# Patient Record
Sex: FEMALE | Race: ASIAN | URBAN - METROPOLITAN AREA
[De-identification: names, ages, dates, MRNs, and addresses within clinical notes are randomized per-mention and may not be internally consistent; named-entity substitution may affect disease eponyms.]

---

## 2017-12-11 ENCOUNTER — OFFICE VISIT (OUTPATIENT)
Dept: INTERNAL MEDICINE CLINIC | Age: 22
End: 2017-12-11

## 2017-12-11 VITALS
SYSTOLIC BLOOD PRESSURE: 126 MMHG | BODY MASS INDEX: 26.24 KG/M2 | HEART RATE: 72 BPM | DIASTOLIC BLOOD PRESSURE: 82 MMHG | HEIGHT: 61 IN | WEIGHT: 139 LBS

## 2017-12-11 DIAGNOSIS — K21.9 GASTROESOPHAGEAL REFLUX DISEASE WITHOUT ESOPHAGITIS: ICD-10-CM

## 2017-12-11 DIAGNOSIS — E03.9 ACQUIRED HYPOTHYROIDISM: ICD-10-CM

## 2017-12-11 DIAGNOSIS — Z83.3 FAMILY HISTORY OF DIABETES MELLITUS (DM): ICD-10-CM

## 2017-12-11 DIAGNOSIS — Z12.4 CERVICAL CANCER SCREENING: ICD-10-CM

## 2017-12-11 DIAGNOSIS — Z13.1 SCREENING FOR DIABETES MELLITUS (DM): ICD-10-CM

## 2017-12-11 DIAGNOSIS — E03.9 ACQUIRED HYPOTHYROIDISM: Primary | ICD-10-CM

## 2017-12-11 DIAGNOSIS — Z91.89 AT RISK FOR INFERTILITY: ICD-10-CM

## 2017-12-11 DIAGNOSIS — L30.9 DERMATITIS: ICD-10-CM

## 2017-12-11 LAB
BILIRUBIN URINE: NEGATIVE
BLOOD, URINE: NEGATIVE
CHOLESTEROL, TOTAL: 242 MG/DL (ref 0–199)
CLARITY: CLEAR
COLOR: YELLOW
GLUCOSE URINE: NEGATIVE MG/DL
HCT VFR BLD CALC: 42 % (ref 36–48)
HDLC SERPL-MCNC: 46 MG/DL (ref 40–60)
HEMOGLOBIN: 13.9 G/DL (ref 12–16)
KETONES, URINE: NEGATIVE MG/DL
LDL CHOLESTEROL CALCULATED: 137 MG/DL
LEUKOCYTE ESTERASE, URINE: NEGATIVE
MCH RBC QN AUTO: 30 PG (ref 26–34)
MCHC RBC AUTO-ENTMCNC: 33.1 G/DL (ref 31–36)
MCV RBC AUTO: 90.6 FL (ref 80–100)
MICROSCOPIC EXAMINATION: NORMAL
NITRITE, URINE: NEGATIVE
PDW BLD-RTO: 13.2 % (ref 12.4–15.4)
PH UA: 6
PLATELET # BLD: 214 K/UL (ref 135–450)
PMV BLD AUTO: 10.3 FL (ref 5–10.5)
PROTEIN UA: NEGATIVE MG/DL
RBC # BLD: 4.63 M/UL (ref 4–5.2)
SPECIFIC GRAVITY UA: <1.005
TRIGL SERPL-MCNC: 296 MG/DL (ref 0–150)
TSH REFLEX: 2.32 UIU/ML (ref 0.27–4.2)
URINE TYPE: NORMAL
UROBILINOGEN, URINE: 0.2 E.U./DL
VLDLC SERPL CALC-MCNC: 59 MG/DL
WBC # BLD: 10 K/UL (ref 4–11)

## 2017-12-11 PROCEDURE — 99204 OFFICE O/P NEW MOD 45 MIN: CPT | Performed by: INTERNAL MEDICINE

## 2017-12-11 ASSESSMENT — ENCOUNTER SYMPTOMS
CHEST TIGHTNESS: 0
COUGH: 0
DIARRHEA: 0
NAUSEA: 0
ABDOMINAL PAIN: 0
TROUBLE SWALLOWING: 0
VOMITING: 0
SHORTNESS OF BREATH: 0

## 2017-12-11 ASSESSMENT — PATIENT HEALTH QUESTIONNAIRE - PHQ9
2. FEELING DOWN, DEPRESSED OR HOPELESS: 0
SUM OF ALL RESPONSES TO PHQ9 QUESTIONS 1 & 2: 0
1. LITTLE INTEREST OR PLEASURE IN DOING THINGS: 0
SUM OF ALL RESPONSES TO PHQ QUESTIONS 1-9: 0

## 2017-12-11 NOTE — PROGRESS NOTES
referral to Obstetrics / Gynecology    Screening for diabetes mellitus (DM)  -     HEMOGLOBIN A1C; Future      An After Visit Summary was printed and given to the patient. Documentation was done using voice recognition dragon software. Every effort was made to ensure accuracy; however, inadvertent  Unintentional computerized transcription errors may be present. Orders Placed This Encounter   Procedures    TSH with Reflex     Standing Status:   Future     Number of Occurrences:   1     Standing Expiration Date:   12/11/2018    Urinalysis     Standing Status:   Future     Number of Occurrences:   1     Standing Expiration Date:   12/11/2018    LIPID PANEL     Standing Status:   Future     Number of Occurrences:   1     Standing Expiration Date:   12/11/2018     Order Specific Question:   Is Patient Fasting?/# of Hours     Answer:   fasting    HEMOGLOBIN A1C     Standing Status:   Future     Number of Occurrences:   1     Standing Expiration Date:   12/11/2018    CBC     Standing Status:   Future     Number of Occurrences:   1     Standing Expiration Date:   12/11/2018    Ambulatory referral to Obstetrics / Gynecology     Referral Priority:   Routine     Referral Type:   Consult for Advice and Opinion     Referred to Provider:   Agustin Mott     Number of Visits Requested:   1     Current Outpatient Prescriptions   Medication Sig Dispense Refill    fluocinonide (LIDEX) 0.05 % cream Apply topically 2 times daily for 10 days. May repeat as needed 60 g 2     No current facility-administered medications for this visit. Return in about 3 months (around 3/11/2018). An After Visit Summary was printed and given to the patient. Documentation was done using voice recognition dragon software. Every effort was made to ensure accuracy; however, inadvertent  Unintentional computerized transcription errors may be present.

## 2017-12-12 LAB
ESTIMATED AVERAGE GLUCOSE: 108.3 MG/DL
HBA1C MFR BLD: 5.4 %

## 2018-08-01 ENCOUNTER — OFFICE VISIT (OUTPATIENT)
Dept: INTERNAL MEDICINE CLINIC | Age: 23
End: 2018-08-01

## 2018-08-01 VITALS
DIASTOLIC BLOOD PRESSURE: 82 MMHG | OXYGEN SATURATION: 98 % | HEIGHT: 65 IN | SYSTOLIC BLOOD PRESSURE: 126 MMHG | HEART RATE: 96 BPM | BODY MASS INDEX: 23.32 KG/M2 | WEIGHT: 140 LBS

## 2018-08-01 DIAGNOSIS — R12 HEARTBURN: ICD-10-CM

## 2018-08-01 DIAGNOSIS — R00.2 PALPITATIONS: Primary | ICD-10-CM

## 2018-08-01 DIAGNOSIS — N92.6 IRREGULAR MENSES: ICD-10-CM

## 2018-08-01 DIAGNOSIS — R63.5 ABNORMAL WEIGHT GAIN: ICD-10-CM

## 2018-08-01 DIAGNOSIS — R06.83 SNORING: ICD-10-CM

## 2018-08-01 DIAGNOSIS — E78.5 HYPERLIPIDEMIA, UNSPECIFIED HYPERLIPIDEMIA TYPE: ICD-10-CM

## 2018-08-01 LAB
A/G RATIO: 1.9 (ref 1.1–2.2)
ALBUMIN SERPL-MCNC: 4.6 G/DL (ref 3.4–5)
ALP BLD-CCNC: 117 U/L (ref 40–129)
ALT SERPL-CCNC: 32 U/L (ref 10–40)
ANION GAP SERPL CALCULATED.3IONS-SCNC: 10 MMOL/L (ref 3–16)
AST SERPL-CCNC: 21 U/L (ref 15–37)
BILIRUB SERPL-MCNC: 0.4 MG/DL (ref 0–1)
BUN BLDV-MCNC: 11 MG/DL (ref 7–20)
CALCIUM SERPL-MCNC: 10.1 MG/DL (ref 8.3–10.6)
CHLORIDE BLD-SCNC: 102 MMOL/L (ref 99–110)
CHOLESTEROL, TOTAL: 214 MG/DL (ref 0–199)
CO2: 27 MMOL/L (ref 21–32)
CONTROL: NORMAL
CREAT SERPL-MCNC: 0.6 MG/DL (ref 0.6–1.1)
ESTIMATED AVERAGE GLUCOSE: 105.4 MG/DL
GFR AFRICAN AMERICAN: >60
GFR NON-AFRICAN AMERICAN: >60
GLOBULIN: 2.4 G/DL
GLUCOSE BLD-MCNC: 88 MG/DL (ref 70–99)
HBA1C MFR BLD: 5.3 %
HDLC SERPL-MCNC: 44 MG/DL (ref 40–60)
LDL CHOLESTEROL CALCULATED: 127 MG/DL
POTASSIUM SERPL-SCNC: 5 MMOL/L (ref 3.5–5.1)
PREGNANCY TEST URINE, POC: NEGATIVE
SODIUM BLD-SCNC: 139 MMOL/L (ref 136–145)
TOTAL PROTEIN: 7 G/DL (ref 6.4–8.2)
TRIGL SERPL-MCNC: 213 MG/DL (ref 0–150)
VLDLC SERPL CALC-MCNC: 43 MG/DL

## 2018-08-01 PROCEDURE — 93000 ELECTROCARDIOGRAM COMPLETE: CPT | Performed by: INTERNAL MEDICINE

## 2018-08-01 PROCEDURE — 81025 URINE PREGNANCY TEST: CPT | Performed by: INTERNAL MEDICINE

## 2018-08-01 PROCEDURE — 99204 OFFICE O/P NEW MOD 45 MIN: CPT | Performed by: INTERNAL MEDICINE

## 2018-08-01 RX ORDER — ESOMEPRAZOLE MAGNESIUM 20 MG/1
1 TABLET, DELAYED RELEASE ORAL DAILY
Qty: 30 TABLET | Refills: 1 | Status: SHIPPED | OUTPATIENT
Start: 2018-08-01 | End: 2018-11-26 | Stop reason: SDUPTHER

## 2018-08-01 ASSESSMENT — ENCOUNTER SYMPTOMS
HEARTBURN: 1
SHORTNESS OF BREATH: 1
NAUSEA: 1
ABDOMINAL PAIN: 1
VOMITING: 0

## 2018-08-01 NOTE — PROGRESS NOTES
Spouse name: N/A    Number of children: 0    Years of education: N/A     Occupational History    Customer Service      [employer]      Social History Main Topics    Smoking status: Never Smoker    Smokeless tobacco: Never Used    Alcohol use No    Drug use: No    Sexual activity: Yes     Partners: Male      Comment: trying to get pregnant     Other Topics Concern    Not on file     Social History Narrative    Lives with  and brother        Family History   Problem Relation Age of Onset    Diabetes Mother     Osteoporosis Mother     Hypertension Father     Diabetes Maternal Grandmother          Review of Systems:  Review of Systems   Constitutional: Negative for fatigue, fever and weight loss. HENT: Negative for ear pain, hearing loss, postnasal drip, rhinorrhea, sinus pressure, sore throat and tinnitus. Eyes: Negative for redness. Respiratory: Positive for shortness of breath. Negative for cough, chest tightness and wheezing. Cardiovascular: Positive for chest pain, palpitations and near-syncope. Negative for leg swelling and syncope. Gastrointestinal: Positive for abdominal pain, heartburn and nausea. Negative for constipation, diarrhea, melena and vomiting. Genitourinary: Negative for dysuria and frequency. Musculoskeletal: Negative for arthralgias, back pain, joint swelling and muscle weakness. Skin: Negative for rash. Neurological: Positive for dizziness. Negative for syncope and headaches. Objective:    Vitals:    08/01/18 0856   BP: 126/82   Pulse: 96   SpO2: 98%   Weight: 140 lb (63.5 kg)   Height: 5' 5\" (1.651 m)     Body mass index is 23.3 kg/m². Physical Exam   Constitutional: She appears well-developed and well-nourished. She does not have a sickly appearance. HENT:   Head: Atraumatic.    Right Ear: Hearing, tympanic membrane, external ear and ear canal normal.   Left Ear: Hearing, tympanic membrane, external ear and ear canal normal.   Nose: 10/1/2018) for Palpitations, heartburn . [address]      Documentation was done using voice recognition dragon software. Every effort was made to ensure accuracy; however, inadvertent, unintentional computerized transcription errors may be present.

## 2018-08-03 ENCOUNTER — PATIENT MESSAGE (OUTPATIENT)
Dept: INTERNAL MEDICINE CLINIC | Age: 23
End: 2018-08-03

## 2018-08-03 DIAGNOSIS — E55.9 VITAMIN D DEFICIENCY: Primary | ICD-10-CM

## 2018-08-03 LAB
H PYLORI BREATH TEST: NEGATIVE
SEX HORMONE BINDING GLOBULIN: 15 NMOL/L (ref 30–135)
TESTOSTERONE FREE-NONMALE: 9.8 PG/ML (ref 0.8–7.4)
TESTOSTERONE TOTAL: 37 NG/DL (ref 20–70)

## 2018-08-03 ASSESSMENT — ENCOUNTER SYMPTOMS
CHEST TIGHTNESS: 0
BACK PAIN: 0
DIARRHEA: 0
EYE REDNESS: 0
RHINORRHEA: 0
SINUS PRESSURE: 0
WHEEZING: 0
SORE THROAT: 0
CONSTIPATION: 0
COUGH: 0

## 2018-08-06 DIAGNOSIS — E55.9 VITAMIN D DEFICIENCY: ICD-10-CM

## 2018-08-06 NOTE — TELEPHONE ENCOUNTER
From: Paige Sweeney  To: Hannah Patiño MD  Sent: 8/3/2018 11:52 PM EDT  Subject: Test Results Question    What was my vitamin D level? I had requested to test that also but couldnt see or may be didnt recognize so thought i would just ask.

## 2018-08-07 ENCOUNTER — TELEPHONE (OUTPATIENT)
Dept: INTERNAL MEDICINE CLINIC | Age: 23
End: 2018-08-07

## 2018-08-07 NOTE — TELEPHONE ENCOUNTER
Tyrell Epley with [facility] called to notify the request for Vitamin D could not be completed. The blood specimen is 3days old, left message to notify pt.

## 2018-11-26 DIAGNOSIS — R12 HEARTBURN: ICD-10-CM

## 2018-11-26 RX ORDER — ESOMEPRAZOLE MAGNESIUM 20 MG/1
1 TABLET, DELAYED RELEASE ORAL DAILY
Qty: 30 TABLET | Refills: 1 | Status: SHIPPED | OUTPATIENT
Start: 2018-11-26

## 2020-03-23 ENCOUNTER — TELEPHONE (OUTPATIENT)
Dept: INTERNAL MEDICINE CLINIC | Age: 25
End: 2020-03-23

## 2020-03-30 ENCOUNTER — VIRTUAL VISIT (OUTPATIENT)
Dept: INTERNAL MEDICINE CLINIC | Age: 25
End: 2020-03-30

## 2020-03-30 PROCEDURE — 99213 OFFICE O/P EST LOW 20 MIN: CPT | Performed by: INTERNAL MEDICINE

## 2020-03-30 RX ORDER — HYDROXYZINE PAMOATE 25 MG/1
25 CAPSULE ORAL 2 TIMES DAILY PRN
Qty: 60 CAPSULE | Refills: 2 | Status: SHIPPED | OUTPATIENT
Start: 2020-03-30 | End: 2020-04-23

## 2020-03-30 ASSESSMENT — ENCOUNTER SYMPTOMS
SHORTNESS OF BREATH: 1
EYES NEGATIVE: 1

## 2020-04-23 RX ORDER — HYDROXYZINE PAMOATE 25 MG/1
25 CAPSULE ORAL 2 TIMES DAILY PRN
Qty: 60 CAPSULE | Refills: 2 | Status: SHIPPED | OUTPATIENT
Start: 2020-04-23 | End: 2020-11-17 | Stop reason: ALTCHOICE

## 2020-04-28 NOTE — TELEPHONE ENCOUNTER
From: Pennelope Primrose  Sent: 11/26/2018 1:14 PM EST  Subject: Medication Renewal Request    Pennelope Primrose would like a refill of the following medications:     Esomeprazole Magnesium 20 MG TBEC Maryam Guerin MD]    Preferred pharmacy: [facility] #[id number]. - P [phone] - F [phone]    Comment: 29-Apr-2020 01:47 29-Apr-2020 08:18

## 2020-11-17 ENCOUNTER — PATIENT MESSAGE (OUTPATIENT)
Dept: INTERNAL MEDICINE CLINIC | Age: 25
End: 2020-11-17

## 2020-11-17 ENCOUNTER — OFFICE VISIT (OUTPATIENT)
Dept: INTERNAL MEDICINE CLINIC | Age: 25
End: 2020-11-17
Payer: COMMERCIAL

## 2020-11-17 VITALS
DIASTOLIC BLOOD PRESSURE: 70 MMHG | HEART RATE: 90 BPM | SYSTOLIC BLOOD PRESSURE: 104 MMHG | OXYGEN SATURATION: 98 % | BODY MASS INDEX: 22.8 KG/M2 | WEIGHT: 137 LBS

## 2020-11-17 DIAGNOSIS — E28.2 PCOS (POLYCYSTIC OVARIAN SYNDROME): ICD-10-CM

## 2020-11-17 DIAGNOSIS — Z11.4 SCREENING FOR HIV (HUMAN IMMUNODEFICIENCY VIRUS): ICD-10-CM

## 2020-11-17 DIAGNOSIS — E55.9 VITAMIN D DEFICIENCY: ICD-10-CM

## 2020-11-17 DIAGNOSIS — Z13.220 SCREENING, LIPID: ICD-10-CM

## 2020-11-17 LAB
A/G RATIO: 1.8 (ref 1.1–2.2)
ALBUMIN SERPL-MCNC: 4.6 G/DL (ref 3.4–5)
ALP BLD-CCNC: 156 U/L (ref 40–129)
ALT SERPL-CCNC: 29 U/L (ref 10–40)
ANION GAP SERPL CALCULATED.3IONS-SCNC: 13 MMOL/L (ref 3–16)
AST SERPL-CCNC: 22 U/L (ref 15–37)
BASOPHILS ABSOLUTE: 0 K/UL (ref 0–0.2)
BASOPHILS RELATIVE PERCENT: 0.3 %
BILIRUB SERPL-MCNC: 0.3 MG/DL (ref 0–1)
BUN BLDV-MCNC: 11 MG/DL (ref 7–20)
CALCIUM SERPL-MCNC: 9.9 MG/DL (ref 8.3–10.6)
CHLORIDE BLD-SCNC: 104 MMOL/L (ref 99–110)
CHOLESTEROL, TOTAL: 214 MG/DL (ref 0–199)
CO2: 22 MMOL/L (ref 21–32)
CREAT SERPL-MCNC: 0.6 MG/DL (ref 0.6–1.1)
EOSINOPHILS ABSOLUTE: 0 K/UL (ref 0–0.6)
EOSINOPHILS RELATIVE PERCENT: 0.6 %
GFR AFRICAN AMERICAN: >60
GFR NON-AFRICAN AMERICAN: >60
GLOBULIN: 2.6 G/DL
GLUCOSE BLD-MCNC: 95 MG/DL (ref 70–99)
HCT VFR BLD CALC: 40.4 % (ref 36–48)
HDLC SERPL-MCNC: 51 MG/DL (ref 40–60)
HEMOGLOBIN: 13.9 G/DL (ref 12–16)
LDL CHOLESTEROL CALCULATED: 128 MG/DL
LYMPHOCYTES ABSOLUTE: 2.5 K/UL (ref 1–5.1)
LYMPHOCYTES RELATIVE PERCENT: 29.4 %
MCH RBC QN AUTO: 29.9 PG (ref 26–34)
MCHC RBC AUTO-ENTMCNC: 34.4 G/DL (ref 31–36)
MCV RBC AUTO: 87 FL (ref 80–100)
MONOCYTES ABSOLUTE: 0.4 K/UL (ref 0–1.3)
MONOCYTES RELATIVE PERCENT: 4.7 %
NEUTROPHILS ABSOLUTE: 5.5 K/UL (ref 1.7–7.7)
NEUTROPHILS RELATIVE PERCENT: 65 %
PDW BLD-RTO: 13.1 % (ref 12.4–15.4)
PLATELET # BLD: 218 K/UL (ref 135–450)
PMV BLD AUTO: 9.8 FL (ref 5–10.5)
POTASSIUM SERPL-SCNC: 4.5 MMOL/L (ref 3.5–5.1)
RBC # BLD: 4.64 M/UL (ref 4–5.2)
SODIUM BLD-SCNC: 139 MMOL/L (ref 136–145)
TOTAL PROTEIN: 7.2 G/DL (ref 6.4–8.2)
TRIGL SERPL-MCNC: 173 MG/DL (ref 0–150)
VITAMIN D 25-HYDROXY: 23.3 NG/ML
VLDLC SERPL CALC-MCNC: 35 MG/DL
WBC # BLD: 8.4 K/UL (ref 4–11)

## 2020-11-17 PROCEDURE — 90471 IMMUNIZATION ADMIN: CPT | Performed by: INTERNAL MEDICINE

## 2020-11-17 PROCEDURE — 99395 PREV VISIT EST AGE 18-39: CPT | Performed by: INTERNAL MEDICINE

## 2020-11-17 PROCEDURE — 90686 IIV4 VACC NO PRSV 0.5 ML IM: CPT | Performed by: INTERNAL MEDICINE

## 2020-11-17 RX ORDER — SUMATRIPTAN 100 MG/1
100 TABLET, FILM COATED ORAL DAILY PRN
Qty: 27 TABLET | Refills: 1 | Status: SHIPPED | OUTPATIENT
Start: 2020-11-17

## 2020-11-17 ASSESSMENT — PATIENT HEALTH QUESTIONNAIRE - PHQ9
SUM OF ALL RESPONSES TO PHQ QUESTIONS 1-9: 2
1. LITTLE INTEREST OR PLEASURE IN DOING THINGS: 1
2. FEELING DOWN, DEPRESSED OR HOPELESS: 1
SUM OF ALL RESPONSES TO PHQ QUESTIONS 1-9: 2
SUM OF ALL RESPONSES TO PHQ9 QUESTIONS 1 & 2: 2
SUM OF ALL RESPONSES TO PHQ QUESTIONS 1-9: 2

## 2020-11-17 NOTE — TELEPHONE ENCOUNTER
From: Megan Davey  To: Duane Tinoco MD  Sent: 11/17/2020 10:40 AM EST  Subject: Visit Follow-Up Question    Kevin Calvocy,  Can I get the thyroid checked through the same blood work that I forgot to mention during the visit. TSH with/without Reflex would be great. Thank you.

## 2020-11-17 NOTE — PROGRESS NOTES
Vaccine Information Sheet, \"Influenza - Inactivated\"  given to Maria Elena Arana, or parent/legal guardian of  Maria Elena Arana and verbalized understanding. Patient responses:    Have you ever had a reaction to a flu vaccine? No  Do you have any current illness? No  Have you ever had Guillian [place] Syndrome? No  Do you have a serious allergy to any of the follow: Neomycin, Polymyxin, Thimerosal, eggs or egg products? No    Flu vaccine given per order. Please see immunization tab. Risks and benefits explained. Current VIS given.       Immunizations Administered     Name Date Dose Route    Influenza, Quadv, IM, PF (6 mo and older Fluzone, Flulaval, Fluarix, and 3 yrs and older Afluria) 11/17/2020 0.5 mL Intramuscular    Site: Deltoid- Right    Lot: [id number]    NDC: 57882-529-16

## 2020-11-17 NOTE — PROGRESS NOTES
History and Physical      Shireen Jackson  YOB: 1995    Date of Service:  11/17/2020    Chief Complaint:   Shireen Jackson is a 30 y.o. female who presents for complete physical examination. HPI:   -1 week ago checked her sugar. Still has pain in that finger  -She developed a headache after eating a snickers bars. Tried ibuprofen, continues to have headaches. Goes to sleep with HA and wakes up with headache. Location was frontal. Pain was throbbing, needle prick. She gets photosensitivity and photosensitivity. She gets nausea. HA occurs 1-4 times pe month    Gets chest pain  Left side of chest  Throbbing pain  Occurred at rest  Needle sensation  Started 14 days ago  Constant  6/10  Nexium did not help         Joint pain  Right hip and right arm hurt  Not sure if she was lying wrong. Stress relief deep breathing, cuddle with        Wt Readings from Last 3 Encounters:   11/17/20 137 lb (62.1 kg)   08/01/18 140 lb (63.5 kg)   12/11/17 139 lb (63 kg)     BP Readings from Last 3 Encounters:   11/17/20 104/70   08/01/18 126/82   12/11/17 126/82       Patient Active Problem List   Diagnosis    Dermatitis    Family history of diabetes mellitus (DM)    Gastroesophageal reflux disease without esophagitis       Preventive Care:  Health Maintenance   Topic Date Due    Varicella vaccine (1 of 2 - 2-dose childhood series) 09/02/1994    HIV screen  09/02/2008    Cervical cancer screen  11/10/2019    Flu vaccine (1) 09/01/2020    DTaP/Tdap/Td vaccine (2 - Td) 02/06/2025    Hepatitis A vaccine  Aged Out    Hepatitis B vaccine  Aged Out    Hib vaccine  Aged Out    Meningococcal (ACWY) vaccine  Aged Out    Pneumococcal 0-64 years Vaccine  Aged Out      Last eye exam: none  Last dentist exam: overdue.  Has wisdom teeth coming in   Exercise: no regular exercise  Sleep: 7 hours   Servings of veggies per day: a lot  Servings of fruit per day: daily  Sugary beverages: no  Red Meat intake: file     Relationship status: Not on file    Intimate partner violence     Fear of current or ex partner: Not on file     Emotionally abused: Not on file     Physically abused: Not on file     Forced sexual activity: Not on file   Other Topics Concern    Not on file   Social History Narrative    Lives with  and brother        Review of Systems:  A comprehensive review of systems was negative except for what was noted in the HPI. Physical Exam:   Vitals:    11/17/20 0858   BP: 104/70   Pulse: 90   SpO2: 98%   Weight: 137 lb (62.1 kg)     Body mass index is 22.8 kg/m². There were no vitals filed for this visit. Physical Exam  Constitutional:       Appearance: She is well-developed. HENT:      Head: Atraumatic. Right Ear: Hearing, tympanic membrane, ear canal and external ear normal.      Left Ear: Hearing, tympanic membrane, ear canal and external ear normal.      Nose: Nose normal. No mucosal edema or rhinorrhea. Eyes:      General: No scleral icterus. Pupils: Pupils are equal, round, and reactive to light. Neck:      Thyroid: No thyroid mass or thyromegaly. Trachea: Trachea normal.   Cardiovascular:      Rate and Rhythm: Normal rate and regular rhythm. Heart sounds: Normal heart sounds, S1 normal and S2 normal. No murmur. Pulmonary:      Effort: Pulmonary effort is normal. No respiratory distress. Breath sounds: Normal breath sounds. No wheezing, rhonchi or rales. Abdominal:      General: Bowel sounds are normal.      Palpations: Abdomen is soft. Tenderness: There is no abdominal tenderness. Lymphadenopathy:      Cervical: No cervical adenopathy. Skin:     General: Skin is warm and dry. Findings: No rash. Neurological:      Mental Status: She is alert. Cranial Nerves: No cranial nerve deficit. Motor: No abnormal muscle tone. Gait: Gait normal.   Psychiatric:         Mood and Affect: Mood is anxious.          Behavior: Behavior normal. Behavior is cooperative. Thought Content: Thought content normal.       Assessment/Plan:    Gary Mendoza was seen today for annual exam.    Diagnoses and all orders for this visit:    Well adult exam    Screening, lipid  -     Lipid Panel; Future    PCOS (polycystic ovarian syndrome)  -     Hemoglobin A1C; Future  -     COMPREHENSIVE METABOLIC PANEL; Future  -     CBC Auto Differential; Future    Migraine without aura and without status migrainosus, not intractable  -     SUMAtriptan (IMITREX) 100 MG tablet; Take 1 tablet by mouth daily as needed for Migraine May repeat in two hours if no improvement    Screening for HIV (human immunodeficiency virus)  -     HIV Screen; Future    Vitamin D deficiency  -     Vitamin D 25 Hydroxy; Future    Needs flu shot  -     INFLUENZA, QUADV, 3 YRS AND OLDER, IM PF, PREFILL SYR OR SDV, 0.5ML (AFLURIA QUADV, PF)            Patient Instructions   Migraine  Start Imitrex    Anxiety  Schedule with psychologist  Start exercising   Check fasting blood work     Finger pain  Do warm soaks and antibacterial ointment           Aminata Jaime MD      Documentation was done using voice recognition dragon software. Every effort was made to ensure accuracy; however, inadvertent, unintentional computerized transcription errors may be present.

## 2020-11-17 NOTE — PATIENT INSTRUCTIONS
Migraine  Start Imitrex    Anxiety  Schedule with psychologist  Start exercising   Check fasting blood work     Finger pain  Do warm soaks and antibacterial ointment

## 2020-11-18 DIAGNOSIS — F41.0 PANIC ATTACK: ICD-10-CM

## 2020-11-18 LAB
ESTIMATED AVERAGE GLUCOSE: 105.4 MG/DL
HBA1C MFR BLD: 5.3 %
HIV AG/AB: NORMAL
HIV ANTIGEN: NORMAL
HIV-1 ANTIBODY: NORMAL
HIV-2 AB: NORMAL
TSH REFLEX: 1.51 UIU/ML (ref 0.27–4.2)

## 2021-04-13 LAB
HPV COMMENT: NORMAL
HPV TYPE 16: NOT DETECTED
HPV TYPE 18: NOT DETECTED
HPVOH (OTHER TYPES): NOT DETECTED

## 2021-05-19 ENCOUNTER — PATIENT MESSAGE (OUTPATIENT)
Dept: INTERNAL MEDICINE CLINIC | Age: 26
End: 2021-05-19

## 2021-06-24 ENCOUNTER — OFFICE VISIT (OUTPATIENT)
Dept: PSYCHOLOGY | Age: 26
End: 2021-06-24
Payer: COMMERCIAL

## 2021-06-24 DIAGNOSIS — F43.23 ADJUSTMENT DISORDER WITH MIXED ANXIETY AND DEPRESSED MOOD: Primary | ICD-10-CM

## 2021-06-24 PROCEDURE — 90791 PSYCH DIAGNOSTIC EVALUATION: CPT | Performed by: PSYCHOLOGIST

## 2021-06-24 NOTE — PROGRESS NOTES
Behavioral Health Consultation  Richardson Vazquez, Ph.D.  Psychologist  6/24/2021   2:39 PM EDT       Time spent with Patient: 30 minutes  [facility] Reason for Consult:    Chief Complaint   Patient presents with    Depression    Anxiety     Referring Provider: No referring provider defined for this encounter. Pt provided informed consent for the behavioral health program. Discussed with patient model of service to include the limits of confidentiality (i.e. abuse reporting, suicide  intervention, etc.) and short-term intervention focused approach. Pt indicated understanding. Feedback given to PCP. S:  Pt seen per PCP re: other    Pt seen for intake and reported sxs consistent w/ an Adjustment Disorder with mixed depressed mood and anxiety. Pt specifically endorsed  depressed mood, deactivation, anhedonia, poor self-worth,  insomnia/hypersomnia, fatigue,, and poor concentration/focus as well as anxious thoughts, muscle tension, restlessness as well as panic attacks. Pt reported that their sxs began several months ago and attributed onset to COVID as well as the recent divorce from her partner. Sxs are exacerbated by limited engagement in self-care. Mitigating factors include mother support. . Focused intervention on psychoed re: depression, anxiety, cycle of deactivation, bx activation, and deep breathing. See pt instructions for specific behavioral goals. Pt is moving to [place] this weekend. Encouraged Pt to get a therapist when she moves.      O:  MSE:    Appearance: good hygiene   Attitude: cooperative and friendly  Consciousness: alert  Orientation: oriented to person, place, time, general circumstance  Memory: recent and remote memory intact  Attention/Concentration: intact during session  Psychomotor Activity:normal  Eye Contact: normal  Speech: normal rate and volume, well-articulated  Mood: Anxious and Depressed  Affect: congruent  Perception: within normal limits  Thought Content: within normal limits  Thought Process: logical, coherent  Insight: good  Judgment: intact  Ability to understand instructions: Yes  Morbid Ideation: no   Suicide Assessment: no suicidal ideation, plan, or intent  Homicidal Ideation: no     History:    Social History:   Social History     Socioeconomic History    Marital status:      Spouse name: Not on file    Number of children: 0    Years of education: Not on file    Highest education level: Not on file   Occupational History    Occupation: Customer Service     Comment: [employer]    Tobacco Use    Smoking status: Never Smoker    Smokeless tobacco: Never Used   Substance and Sexual Activity    Alcohol use: No    Drug use: No    Sexual activity: Yes     Partners: Male     Comment: trying to get pregnant   Other Topics Concern    Not on file   Social History Narrative    Lives with  and brother      Social Determinants of Health     Financial Resource Strain:     Difficulty of Paying Living Expenses:    Food Insecurity:     Worried About Running Out of Food in the Last Year:     Ran Out of Food in the Last Year:    Transportation Needs:     Lack of Transportation (Medical):  Lack of Transportation (Non-Medical):    Physical Activity:     Days of Exercise per Week:     Minutes of Exercise per Session:    Stress:     Feeling of Stress :    Social Connections:     Frequency of Communication with Friends and Family:     Frequency of Social Gatherings with Friends and Family:     Attends Denominational Services:     Active Member of Clubs or Organizations:     Attends Club or Organization Meetings:     Marital Status:    Intimate Partner Violence:     Fear of Current or Ex-Partner:     Emotionally Abused:     Physically Abused:     Sexually Abused:      TOBACCO:   reports that she has never smoked. She has never used smokeless tobacco.  ETOH:   reports no history of alcohol use.     A:  Patient engaged and cooperative. Denies SI. Insight and motivation are good. Diagnosis:    1. Adjustment disorder with mixed anxiety and depressed mood          Plan:    Patient Instructions   1) When you have a panic attack, use cupped breathing, lower your visual gaze,       Pt interventions:  See above    No follow-ups on file. Documentation was done using voice recognition dragon software. Every effort was made to ensure accuracy; however, inadvertent, unintentional computerized transcription errors may be present.